# Patient Record
Sex: FEMALE | Race: ASIAN | NOT HISPANIC OR LATINO | URBAN - METROPOLITAN AREA
[De-identification: names, ages, dates, MRNs, and addresses within clinical notes are randomized per-mention and may not be internally consistent; named-entity substitution may affect disease eponyms.]

---

## 2022-05-20 ENCOUNTER — EMERGENCY (EMERGENCY)
Facility: HOSPITAL | Age: 32
LOS: 1 days | Discharge: HOME | End: 2022-05-20
Attending: EMERGENCY MEDICINE
Payer: COMMERCIAL

## 2022-05-20 VITALS
RESPIRATION RATE: 16 BRPM | HEART RATE: 71 BPM | OXYGEN SATURATION: 98 % | SYSTOLIC BLOOD PRESSURE: 105 MMHG | DIASTOLIC BLOOD PRESSURE: 67 MMHG

## 2022-05-20 VITALS
WEIGHT: 119.05 LBS | RESPIRATION RATE: 17 BRPM | HEIGHT: 62 IN | OXYGEN SATURATION: 96 % | TEMPERATURE: 99 F | SYSTOLIC BLOOD PRESSURE: 101 MMHG | DIASTOLIC BLOOD PRESSURE: 66 MMHG | HEART RATE: 65 BPM

## 2022-05-20 DIAGNOSIS — O03.4 INCOMPLETE SPONTANEOUS ABORTION WITHOUT COMPLICATION: ICD-10-CM

## 2022-05-20 LAB
ALBUMIN SERPL ELPH-MCNC: 3.6 G/DL — SIGNIFICANT CHANGE UP (ref 3.5–5)
ALP SERPL-CCNC: 63 U/L — SIGNIFICANT CHANGE UP (ref 40–120)
ALT FLD-CCNC: 19 U/L DA — SIGNIFICANT CHANGE UP (ref 10–60)
ANION GAP SERPL CALC-SCNC: 9 MMOL/L — SIGNIFICANT CHANGE UP (ref 5–17)
APPEARANCE UR: SIGNIFICANT CHANGE UP
AST SERPL-CCNC: 20 U/L — SIGNIFICANT CHANGE UP (ref 10–40)
BACTERIA # UR AUTO: ABNORMAL /HPF
BILIRUB SERPL-MCNC: 0.6 MG/DL — SIGNIFICANT CHANGE UP (ref 0.2–1.2)
BILIRUB UR-MCNC: NEGATIVE — SIGNIFICANT CHANGE UP
BLD GP AB SCN SERPL QL: SIGNIFICANT CHANGE UP
BUN SERPL-MCNC: 10 MG/DL — SIGNIFICANT CHANGE UP (ref 7–18)
CALCIUM SERPL-MCNC: 9.1 MG/DL — SIGNIFICANT CHANGE UP (ref 8.4–10.5)
CHLORIDE SERPL-SCNC: 104 MMOL/L — SIGNIFICANT CHANGE UP (ref 96–108)
CO2 SERPL-SCNC: 22 MMOL/L — SIGNIFICANT CHANGE UP (ref 22–31)
COLOR SPEC: YELLOW — SIGNIFICANT CHANGE UP
COMMENT - URINE: SIGNIFICANT CHANGE UP
CREAT SERPL-MCNC: 0.72 MG/DL — SIGNIFICANT CHANGE UP (ref 0.5–1.3)
DIFF PNL FLD: ABNORMAL
EGFR: 116 ML/MIN/1.73M2 — SIGNIFICANT CHANGE UP
EPI CELLS # UR: SIGNIFICANT CHANGE UP /HPF
GLUCOSE SERPL-MCNC: 88 MG/DL — SIGNIFICANT CHANGE UP (ref 70–99)
GLUCOSE UR QL: NEGATIVE — SIGNIFICANT CHANGE UP
HCG SERPL-ACNC: HIGH MIU/ML
HCG UR QL: POSITIVE
HCT VFR BLD CALC: 38.5 % — SIGNIFICANT CHANGE UP (ref 34.5–45)
HGB BLD-MCNC: 12.7 G/DL — SIGNIFICANT CHANGE UP (ref 11.5–15.5)
KETONES UR-MCNC: ABNORMAL
LEUKOCYTE ESTERASE UR-ACNC: ABNORMAL
MCHC RBC-ENTMCNC: 30 PG — SIGNIFICANT CHANGE UP (ref 27–34)
MCHC RBC-ENTMCNC: 33 GM/DL — SIGNIFICANT CHANGE UP (ref 32–36)
MCV RBC AUTO: 91 FL — SIGNIFICANT CHANGE UP (ref 80–100)
NITRITE UR-MCNC: NEGATIVE — SIGNIFICANT CHANGE UP
NRBC # BLD: 0 /100 WBCS — SIGNIFICANT CHANGE UP (ref 0–0)
PH UR: 6 — SIGNIFICANT CHANGE UP (ref 5–8)
PLATELET # BLD AUTO: 320 K/UL — SIGNIFICANT CHANGE UP (ref 150–400)
POTASSIUM SERPL-MCNC: 3.9 MMOL/L — SIGNIFICANT CHANGE UP (ref 3.5–5.3)
POTASSIUM SERPL-SCNC: 3.9 MMOL/L — SIGNIFICANT CHANGE UP (ref 3.5–5.3)
PROT SERPL-MCNC: 7.9 G/DL — SIGNIFICANT CHANGE UP (ref 6–8.3)
PROT UR-MCNC: 30 MG/DL
RBC # BLD: 4.23 M/UL — SIGNIFICANT CHANGE UP (ref 3.8–5.2)
RBC # FLD: 12.7 % — SIGNIFICANT CHANGE UP (ref 10.3–14.5)
RBC CASTS # UR COMP ASSIST: >50 /HPF (ref 0–2)
SODIUM SERPL-SCNC: 135 MMOL/L — SIGNIFICANT CHANGE UP (ref 135–145)
SP GR SPEC: 1.02 — SIGNIFICANT CHANGE UP (ref 1.01–1.02)
UROBILINOGEN FLD QL: NEGATIVE — SIGNIFICANT CHANGE UP
WBC # BLD: 14 K/UL — HIGH (ref 3.8–10.5)
WBC # FLD AUTO: 14 K/UL — HIGH (ref 3.8–10.5)
WBC UR QL: SIGNIFICANT CHANGE UP /HPF (ref 0–5)

## 2022-05-20 PROCEDURE — 76801 OB US < 14 WKS SINGLE FETUS: CPT

## 2022-05-20 PROCEDURE — 80053 COMPREHEN METABOLIC PANEL: CPT

## 2022-05-20 PROCEDURE — 81001 URINALYSIS AUTO W/SCOPE: CPT

## 2022-05-20 PROCEDURE — 76801 OB US < 14 WKS SINGLE FETUS: CPT | Mod: 26

## 2022-05-20 PROCEDURE — 85027 COMPLETE CBC AUTOMATED: CPT

## 2022-05-20 PROCEDURE — 99285 EMERGENCY DEPT VISIT HI MDM: CPT

## 2022-05-20 PROCEDURE — 84702 CHORIONIC GONADOTROPIN TEST: CPT

## 2022-05-20 PROCEDURE — 76817 TRANSVAGINAL US OBSTETRIC: CPT | Mod: 26

## 2022-05-20 PROCEDURE — 86901 BLOOD TYPING SEROLOGIC RH(D): CPT

## 2022-05-20 PROCEDURE — 86900 BLOOD TYPING SEROLOGIC ABO: CPT

## 2022-05-20 PROCEDURE — 99284 EMERGENCY DEPT VISIT MOD MDM: CPT | Mod: 25

## 2022-05-20 PROCEDURE — 86850 RBC ANTIBODY SCREEN: CPT

## 2022-05-20 PROCEDURE — 76817 TRANSVAGINAL US OBSTETRIC: CPT

## 2022-05-20 PROCEDURE — 36415 COLL VENOUS BLD VENIPUNCTURE: CPT

## 2022-05-20 PROCEDURE — 81025 URINE PREGNANCY TEST: CPT

## 2022-05-20 RX ORDER — MISOPROSTOL 200 UG/1
2 TABLET ORAL
Qty: 4 | Refills: 0
Start: 2022-05-20 | End: 2022-05-20

## 2022-05-20 NOTE — ED PROVIDER NOTE - PROGRESS NOTE DETAILS
Ultrasound shows no iup, no products of conception near cervical area, and no signs of ectopic pregnancy. Beta ECG 25,000 and gyn consult recommended Misoprostol. gyn inserted misoprostolol

## 2022-05-20 NOTE — ED ADULT NURSE NOTE - NSIMPLEMENTINTERV_GEN_ALL_ED
Implemented All Universal Safety Interventions:  [place] to call system. Call bell, personal items and telephone within reach. Instruct patient to call for assistance. Room bathroom lighting operational. Non-slip footwear when patient is off stretcher. Physically safe environment: no spills, clutter or unnecessary equipment. Stretcher in lowest position, wheels locked, appropriate side rails in place.

## 2022-05-20 NOTE — CONSULT NOTE ADULT - PROBLEM SELECTOR RECOMMENDATION 9
a/p 32 y/o 6 weeks 3 days with inevitable , stable  place 400mcg cytotec in rectum  patient was instructed to repeat this dose every 3hrs for 2 more times and patient verbalized understanding, will send to pharmacy  tylenol for pain, start PO iron  dc home  f/u in our clinic [address] [place] [address] next week Monday or Tuesday  return to ER if symptoms worsen  dw Dr. [place] house attending

## 2022-05-20 NOTE — ED PROVIDER NOTE - PATIENT PORTAL LINK FT
You can access the FollowMyHealth Patient Portal offered by [facility] by registering at the following website: [email or web address]. By joining [facility]’s FollowMyHealth portal, you will also be able to view your health information using other applications (apps) compatible with our system.

## 2022-05-20 NOTE — CONSULT NOTE ADULT - ASSESSMENT
a/p 32 y/o 6 weeks 3 days with inevitable , stable  place 400mcg cytotec in rectum  patient was instructed to repeat this dose every 3hrs for 2 more times and patient verbalized understanding, will send to pharmacy  tylenol for pain, start PO iron  dc home  f/u in our clinic [address] [place] [address] next week Monday or Tuesday  return to ER if symptoms worsen  dw Dr. [place] house attending

## 2022-05-20 NOTE — CONSULT NOTE ADULT - SUBJECTIVE AND OBJECTIVE BOX
GYN PA Consult Note    30 y/o  LMP 4/4 6 weeks 3 days presented with c/o vaginal bleeding at 1030am associate with pain starting at 10am, soaked 4/10 pads used today. Patient states bleeding is more like a period now. Patient denies LOC, dizziness, admits to nausea. Has no prenatal care yet, just arrived from [place].  pgynx denies h/o uterine fibroid, ovarian cyst, sti  pmhx denies  psx denies  meds denies  allergies denies    Vital Signs Last 24 Hrs  T(C): 37.1 (20 May 2022 13:33), Max: 37.1 (20 May 2022 13:33)  T(F): 98.7 (20 May 2022 13:33), Max: 98.7 (20 May 2022 13:33)  HR: 65 (20 May 2022 13:33) (65 - 65)  BP: 101/66 (20 May 2022 13:33) (101/66 - 101/66)  BP(mean): --  RR: 17 (20 May 2022 13:33) (17 - 17)  SpO2: 96% (20 May 2022 13:33) (96% - 96%)    gen aox3, not in acute distress, appears well, non toxic appearing  abd soft, nontender, no guarding, no rebound  speculum os is open with POC on the external os, blood evacuated with sponge stick no active bleeding, moderate bleeding    HCG Quantitative, Serum: 15759: HCG-Quantitative test interpretations: This test is intended only for the  detection & monitoring of pregnancy. For oncology studies order the tumor  marker test “HCG-TM” (hCG-Tumor Marker).  For pregnancy evaluation the reference values are as follows:  Negative:  <=4 mIU/mL  Indeterminate:  5 - 25 mIU/mL (suggest repeat testing in 72 hours)  Positive:  > 25 mIU/mL  Reference Values during Pregnancy:  Weeks after LMP* REFERENCE INTERVAL mIU/mL     3      6 - 71     4      10 - 750     5      220 - 7,100     6      160 - 32,000     7      3,700 - 164,000     8      32,000 - 150,000     9      64,000 - 151,000     10      47,000 - 187,000     12      28,000 - 211,000     14      14,000 - 63,000     15      12,000 - 71,000     16 - 18  8,000 - 58,000  * LMP:  Last Menstrual Period  HCG results of false positive and false negative for pregnancy are rare,  but can occur with this, and other, hCG tests. Millie- and post-menopausal  females may have mildly elevated hCG concentrations usually less than 14  mIU/mL that are constant over time. mIU/mL (22 @ 14:11)                                12.7   14.00 )-----------( 320      ( 20 May 2022 14:11 )             38.5     ABO RH Interpretation: A POS (22 @ 14:30)    < from: US Transvaginal, OB (22 @ 14:44) >    IMPRESSION: No intrauterine gestational sac is seen. No ectopic pregnancy   is identified by ultrasound. The cervix appears open with apparent   hemorrhage within the endocervical canal. Although findings are   suggestive of spontaneous  in progress, ectopic pregnancy and a   very early intrauterine gestation cannot be excluded. Clinical   correlation with serial beta-hCG is recommended. Follow-up obstetrical   ultrasound in 3-5 days is suggested for reassessment and to rule out   retained products of conception.    < end of copied text >

## 2022-05-20 NOTE — ED PROVIDER NOTE - OBJECTIVE STATEMENT
31 y.o female () with normal vitals is complaining of suprapubic pain and vaginal bleeding. Patient states she recently came [place] and denies nausea, vomiting, smoking, alcohol, and drugs.

## 2022-05-23 ENCOUNTER — OUTPATIENT (OUTPATIENT)
Dept: OUTPATIENT SERVICES | Facility: HOSPITAL | Age: 32
LOS: 1 days | End: 2022-05-23
Payer: COMMERCIAL

## 2022-05-23 ENCOUNTER — APPOINTMENT (OUTPATIENT)
Dept: OBGYN | Facility: CLINIC | Age: 32
End: 2022-05-23
Payer: COMMERCIAL

## 2022-05-23 VITALS
SYSTOLIC BLOOD PRESSURE: 96 MMHG | HEIGHT: 62 IN | DIASTOLIC BLOOD PRESSURE: 62 MMHG | WEIGHT: 137 LBS | HEART RATE: 85 BPM | OXYGEN SATURATION: 99 % | BODY MASS INDEX: 25.21 KG/M2 | TEMPERATURE: 99.1 F

## 2022-05-23 DIAGNOSIS — Z78.9 OTHER SPECIFIED HEALTH STATUS: ICD-10-CM

## 2022-05-23 DIAGNOSIS — O02.1 MISSED ABORTION: ICD-10-CM

## 2022-05-23 DIAGNOSIS — Z00.00 ENCOUNTER FOR GENERAL ADULT MEDICAL EXAMINATION WITHOUT ABNORMAL FINDINGS: ICD-10-CM

## 2022-05-23 PROCEDURE — G0463: CPT

## 2022-05-23 PROCEDURE — 83036 HEMOGLOBIN GLYCOSYLATED A1C: CPT

## 2022-05-23 PROCEDURE — 85025 COMPLETE CBC W/AUTO DIFF WBC: CPT

## 2022-05-23 PROCEDURE — 84702 CHORIONIC GONADOTROPIN TEST: CPT

## 2022-05-23 PROCEDURE — 36415 COLL VENOUS BLD VENIPUNCTURE: CPT

## 2022-05-23 PROCEDURE — 84439 ASSAY OF FREE THYROXINE: CPT

## 2022-05-23 PROCEDURE — 99204 OFFICE O/P NEW MOD 45 MIN: CPT | Mod: 25

## 2022-05-23 PROCEDURE — 84443 ASSAY THYROID STIM HORMONE: CPT

## 2022-05-23 PROCEDURE — 36415 COLL VENOUS BLD VENIPUNCTURE: CPT | Mod: NC

## 2022-05-23 PROCEDURE — 80053 COMPREHEN METABOLIC PANEL: CPT

## 2022-05-23 NOTE — HISTORY OF PRESENT ILLNESS
[Y] : Patient is sexually active [Normal Amount/Duration] :  normal amount and duration [Currently Active] : currently active [TextBox_4] : Pt is an ER follow up for missed ab at 6 weeks  [PGxTotal] : 1 [PGHxFullTerm] : 0 [PGHxPremature] : 0 [PGHxAbortions] : 0 [[facility]] : 0 [PGHxABInduced] : 0 [PGHxABSpont] : 10 [PGHxEctopic] : 0 [PGHxMultBirths] : 0 [FreeTextEntry1] : 04/04/22 [No] : Patient does not have concerns regarding sex [Men] : men [Vaginal] : vaginal

## 2022-05-27 LAB
ALBUMIN SERPL ELPH-MCNC: 4.4 G/DL
ALP BLD-CCNC: 65 U/L
ALT SERPL-CCNC: 17 U/L
ANION GAP SERPL CALC-SCNC: 12 MMOL/L
AST SERPL-CCNC: 18 U/L
BASOPHILS # BLD AUTO: 0.06 K/UL
BASOPHILS NFR BLD AUTO: 0.7 %
BILIRUB SERPL-MCNC: 0.2 MG/DL
BUN SERPL-MCNC: 8 MG/DL
CALCIUM SERPL-MCNC: 9.3 MG/DL
CHLORIDE SERPL-SCNC: 104 MMOL/L
CO2 SERPL-SCNC: 21 MMOL/L
CREAT SERPL-MCNC: 0.56 MG/DL
EGFR: 127 ML/MIN/1.73M2
EOSINOPHIL # BLD AUTO: 0.36 K/UL
EOSINOPHIL NFR BLD AUTO: 4.2 %
ESTIMATED AVERAGE GLUCOSE: 97 MG/DL
GLUCOSE SERPL-MCNC: 87 MG/DL
HBA1C MFR BLD HPLC: 5 %
HCG SERPL-MCNC: 2710 MIU/ML
HCT VFR BLD CALC: 35 %
HGB BLD-MCNC: 11.3 G/DL
IMM GRANULOCYTES NFR BLD AUTO: 0.4 %
LYMPHOCYTES # BLD AUTO: 2.53 K/UL
LYMPHOCYTES NFR BLD AUTO: 29.6 %
MAN DIFF?: NORMAL
MCHC RBC-ENTMCNC: 30.3 PG
MCHC RBC-ENTMCNC: 32.3 GM/DL
MCV RBC AUTO: 93.8 FL
MONOCYTES # BLD AUTO: 0.58 K/UL
MONOCYTES NFR BLD AUTO: 6.8 %
NEUTROPHILS # BLD AUTO: 4.98 K/UL
NEUTROPHILS NFR BLD AUTO: 58.3 %
PLATELET # BLD AUTO: 285 K/UL
POTASSIUM SERPL-SCNC: 4.2 MMOL/L
PROT SERPL-MCNC: 6.7 G/DL
RBC # BLD: 3.73 M/UL
RBC # FLD: 13.2 %
SODIUM SERPL-SCNC: 137 MMOL/L
T4 FREE SERPL-MCNC: 1.2 NG/DL
TSH SERPL-ACNC: 1.17 UIU/ML
WBC # FLD AUTO: 8.54 K/UL

## 2022-06-06 DIAGNOSIS — Z78.9 OTHER SPECIFIED HEALTH STATUS: ICD-10-CM

## 2022-06-06 DIAGNOSIS — O02.1 MISSED ABORTION: ICD-10-CM
